# Patient Record
Sex: FEMALE | Race: WHITE
[De-identification: names, ages, dates, MRNs, and addresses within clinical notes are randomized per-mention and may not be internally consistent; named-entity substitution may affect disease eponyms.]

---

## 2019-04-04 ENCOUNTER — HOSPITAL ENCOUNTER (OUTPATIENT)
Dept: HOSPITAL 93 - RAD | Age: 74
Discharge: HOME | End: 2019-04-04
Payer: COMMERCIAL

## 2019-04-04 DIAGNOSIS — M25.572: ICD-10-CM

## 2019-04-04 DIAGNOSIS — Z12.31: Primary | ICD-10-CM

## 2019-04-04 DIAGNOSIS — Z87.898: ICD-10-CM

## 2019-04-04 DIAGNOSIS — N60.12: ICD-10-CM

## 2019-04-04 DIAGNOSIS — M79.672: ICD-10-CM

## 2019-04-04 DIAGNOSIS — E04.8: ICD-10-CM

## 2019-04-04 DIAGNOSIS — N60.11: ICD-10-CM

## 2019-04-10 ENCOUNTER — HOSPITAL ENCOUNTER (OUTPATIENT)
Dept: HOSPITAL 93 - LAB | Age: 74
Discharge: HOME | End: 2019-04-10
Attending: GENERAL PRACTICE
Payer: COMMERCIAL

## 2019-04-10 ENCOUNTER — HOSPITAL ENCOUNTER (OUTPATIENT)
Dept: HOSPITAL 93 - SONOGRAMA | Age: 74
Discharge: HOME | End: 2019-04-10
Attending: GENERAL PRACTICE
Payer: COMMERCIAL

## 2019-04-10 DIAGNOSIS — N39.0: ICD-10-CM

## 2019-04-10 DIAGNOSIS — R80.8: ICD-10-CM

## 2019-04-10 DIAGNOSIS — D64.89: ICD-10-CM

## 2019-04-10 DIAGNOSIS — Z12.11: ICD-10-CM

## 2019-04-10 DIAGNOSIS — R10.9: Primary | ICD-10-CM

## 2019-04-10 DIAGNOSIS — E11.9: ICD-10-CM

## 2019-04-10 DIAGNOSIS — M60.88: ICD-10-CM

## 2019-04-10 DIAGNOSIS — R10.13: Primary | ICD-10-CM

## 2019-04-10 DIAGNOSIS — R07.89: ICD-10-CM

## 2019-04-17 ENCOUNTER — HOSPITAL ENCOUNTER (OUTPATIENT)
Dept: HOSPITAL 93 - LAB | Age: 74
Discharge: HOME | End: 2019-04-17
Attending: GENERAL PRACTICE
Payer: COMMERCIAL

## 2019-04-17 ENCOUNTER — HOSPITAL ENCOUNTER (OUTPATIENT)
Dept: HOSPITAL 93 - NUCLEAR | Age: 74
Discharge: HOME | End: 2019-04-17
Attending: GENERAL PRACTICE
Payer: COMMERCIAL

## 2019-04-17 DIAGNOSIS — N39.0: ICD-10-CM

## 2019-04-17 DIAGNOSIS — E03.8: ICD-10-CM

## 2019-04-17 DIAGNOSIS — M81.0: Primary | ICD-10-CM

## 2019-04-17 DIAGNOSIS — R80.8: ICD-10-CM

## 2019-04-17 DIAGNOSIS — E78.49: Primary | ICD-10-CM

## 2019-04-17 DIAGNOSIS — E11.9: ICD-10-CM

## 2019-04-17 DIAGNOSIS — M60.88: ICD-10-CM

## 2019-04-17 DIAGNOSIS — E55.9: ICD-10-CM

## 2019-04-17 DIAGNOSIS — D64.89: ICD-10-CM

## 2019-04-17 DIAGNOSIS — R10.9: ICD-10-CM

## 2019-04-17 DIAGNOSIS — Z12.11: ICD-10-CM

## 2019-04-17 DIAGNOSIS — R07.89: ICD-10-CM

## 2020-03-13 ENCOUNTER — HOSPITAL ENCOUNTER (OUTPATIENT)
Dept: HOSPITAL 93 - RAD | Age: 75
Discharge: HOME | End: 2020-03-13
Attending: INTERNAL MEDICINE
Payer: COMMERCIAL

## 2020-03-13 DIAGNOSIS — M12.89: Primary | ICD-10-CM

## 2020-03-13 DIAGNOSIS — M46.47: ICD-10-CM

## 2020-06-17 ENCOUNTER — HOSPITAL ENCOUNTER (OUTPATIENT)
Dept: HOSPITAL 93 - RAD | Age: 75
Discharge: HOME | End: 2020-06-17
Attending: INTERNAL MEDICINE
Payer: COMMERCIAL

## 2020-06-17 DIAGNOSIS — M12.851: Primary | ICD-10-CM

## 2020-08-27 ENCOUNTER — HOSPITAL ENCOUNTER (OUTPATIENT)
Dept: HOSPITAL 93 - LAB | Age: 75
Discharge: HOME | End: 2020-08-27
Attending: INTERNAL MEDICINE
Payer: COMMERCIAL

## 2020-08-27 DIAGNOSIS — E78.2: ICD-10-CM

## 2020-08-27 DIAGNOSIS — E03.8: ICD-10-CM

## 2020-08-27 DIAGNOSIS — I10: ICD-10-CM

## 2020-08-27 DIAGNOSIS — E11.9: Primary | ICD-10-CM

## 2020-09-14 ENCOUNTER — HOSPITAL ENCOUNTER (OUTPATIENT)
Dept: HOSPITAL 93 - RAD | Age: 75
Discharge: HOME | End: 2020-09-14
Attending: INTERNAL MEDICINE
Payer: COMMERCIAL

## 2020-09-14 DIAGNOSIS — M12.862: Primary | ICD-10-CM

## 2020-09-18 ENCOUNTER — HOSPITAL ENCOUNTER (OUTPATIENT)
Dept: HOSPITAL 93 - CIR.AMB | Age: 75
Discharge: HOME | End: 2020-09-18
Attending: ORTHOPAEDIC SURGERY
Payer: COMMERCIAL

## 2020-09-18 DIAGNOSIS — S72.462A: Primary | ICD-10-CM

## 2020-09-18 DIAGNOSIS — Z20.828: ICD-10-CM

## 2020-09-18 PROCEDURE — 27509 TREATMENT OF THIGH FRACTURE: CPT

## 2020-10-07 ENCOUNTER — HOSPITAL ENCOUNTER (OUTPATIENT)
Dept: HOSPITAL 93 - RAD | Age: 75
Discharge: HOME | End: 2020-10-07
Attending: ORTHOPAEDIC SURGERY
Payer: COMMERCIAL

## 2020-10-07 DIAGNOSIS — S72.465D: Primary | ICD-10-CM

## 2020-10-17 ENCOUNTER — HOSPITAL ENCOUNTER (INPATIENT)
Dept: HOSPITAL 93 - ER | Age: 75
LOS: 6 days | Discharge: NURSING, REHAB OR LONG-TERM CARE | DRG: 481 | End: 2020-10-23
Attending: ORTHOPAEDIC SURGERY | Admitting: ORTHOPAEDIC SURGERY
Payer: COMMERCIAL

## 2020-10-17 VITALS — WEIGHT: 120 LBS | HEIGHT: 62 IN | BODY MASS INDEX: 22.08 KG/M2

## 2020-10-17 DIAGNOSIS — E03.9: ICD-10-CM

## 2020-10-17 DIAGNOSIS — Z91.81: ICD-10-CM

## 2020-10-17 DIAGNOSIS — Z20.828: ICD-10-CM
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
## 2020-10-17 DIAGNOSIS — M81.8: ICD-10-CM

## 2020-10-17 DIAGNOSIS — D62: ICD-10-CM

## 2020-10-17 DIAGNOSIS — S72.492D: ICD-10-CM

## 2020-10-17 DIAGNOSIS — S72.322A: Primary | ICD-10-CM

## 2020-10-17 DIAGNOSIS — Z53.8: ICD-10-CM

## 2020-10-17 NOTE — NUR
SE RECIBE PTE EN AMBULANCIA EN COMPANIA DE PERSONAL PARAMEDICO Y FAMILIAR DE
PTE QUIEN REFIERE QUE PTE TIENE HX DE OPERACION DE FEMUR L+ Y SE VOLVIO A
DISLOCAR. AL MOMENTO PTE REFIERE MUCHO DOLOR. PRESENTA BP MANUAL 160/102.

## 2020-10-17 NOTE — NUR
SE INSERTO SONDA URINARIA BAJO MEDIDAS ESTERILES. PACIENTE ELIMINO 150ML DE
ORINA COLOR AMARILLO DANTE.

## 2020-10-17 NOTE — NUR
SE RECIBE PTE ALERTA Y ORIENTADA X 3 ESFERAS EN CAMA CON BARANDAS ELEVADAS POR
SEGURIDAD. BUEN PATRON RESPIRATORIO. H/L DIANA DE EDEMA Y ERITEMA. SONDA
URINARIA DRENANDO A GRAVEDAD. PENDIENTE CONSULTA CON . SE MANTIENE EN
OBSERVACION POR CAMBIOS EN CONDICION MEDICA.

## 2020-10-17 NOTE — NUR
PACIENTE ALERTA Y ORIENTADA X3. MISS. FERNANDEZ ORIENTO A PACIENTE SOBRE TX Y
PROCEDIMEINTO A REALIZAR Y REFIRIO ENTENDER.  REALIZA MUESTRAS DE LABORATORIO
BAJO MEDIDAS ASEPTICAS. CANALIZACION PATENTE Y DIANA DE EDEMA Y ERITEMA.
PACIENTE REFIERE NO DESEA EL MILLIGAN AL MOMENTO. SE ORIENTO SOBRE LA IMPORTANCIA
DEL MISMO. ADMINISTRO MEDICAMENTO ORDENADO POR MD.

## 2020-10-18 NOTE — NUR
SE RECIBE PACIENTE ALERTA Y ORIENTADA EN CAMA CON VENOPUNCION PATENTE DIANA DE
EDEMA Y ERRITEMA. PACIENTE CON JULIANN URINARIA Y TRACCION EN NIA SANDOVAL DE
5 LIBRAS. PACIENTE EN ESPERA DE CONSULTA CON EL DR. LIU.

## 2020-10-19 PROCEDURE — B24BZZZ ULTRASONOGRAPHY OF HEART WITH AORTA: ICD-10-PCS | Performed by: INTERNAL MEDICINE

## 2020-10-20 PROCEDURE — 0QP704Z REMOVAL OF INTERNAL FIXATION DEVICE FROM LEFT UPPER FEMUR, OPEN APPROACH: ICD-10-PCS | Performed by: ORTHOPAEDIC SURGERY

## 2020-10-20 PROCEDURE — 0QS906Z REPOSITION LEFT FEMORAL SHAFT WITH INTRAMEDULLARY INTERNAL FIXATION DEVICE, OPEN APPROACH: ICD-10-PCS | Performed by: ORTHOPAEDIC SURGERY

## 2022-03-19 ENCOUNTER — HOSPITAL ENCOUNTER (EMERGENCY)
Dept: HOSPITAL 93 - ER | Age: 77
End: 2022-03-19
Payer: COMMERCIAL

## 2022-03-19 VITALS — BODY MASS INDEX: 21.57 KG/M2 | HEIGHT: 57 IN | WEIGHT: 100 LBS
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
## 2022-03-19 DIAGNOSIS — S00.83XA: ICD-10-CM

## 2022-03-19 DIAGNOSIS — S01.81XA: Primary | ICD-10-CM

## 2022-03-19 DIAGNOSIS — I10: ICD-10-CM
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
## 2023-06-09 ENCOUNTER — HOSPITAL ENCOUNTER (OUTPATIENT)
Dept: HOSPITAL 93 - LAB | Age: 78
Discharge: HOME | End: 2023-06-09
Attending: INTERNAL MEDICINE
Payer: COMMERCIAL

## 2023-06-09 DIAGNOSIS — Z12.11: ICD-10-CM

## 2023-06-09 DIAGNOSIS — E55.9: ICD-10-CM

## 2023-06-09 DIAGNOSIS — E11.9: ICD-10-CM

## 2023-06-09 DIAGNOSIS — I10: Primary | ICD-10-CM

## 2023-06-09 DIAGNOSIS — E03.9: ICD-10-CM

## 2023-06-09 DIAGNOSIS — E78.2: ICD-10-CM

## 2023-06-09 DIAGNOSIS — K92.0: ICD-10-CM

## 2023-06-21 ENCOUNTER — HOSPITAL ENCOUNTER (OUTPATIENT)
Dept: HOSPITAL 93 - LAB | Age: 78
Discharge: HOME | End: 2023-06-21
Attending: INTERNAL MEDICINE
Payer: COMMERCIAL

## 2023-06-21 ENCOUNTER — HOSPITAL ENCOUNTER (OUTPATIENT)
Dept: HOSPITAL 93 - MAMO-SONO | Age: 78
Discharge: HOME | End: 2023-06-21
Attending: INTERNAL MEDICINE
Payer: COMMERCIAL

## 2023-06-21 DIAGNOSIS — E55.9: ICD-10-CM

## 2023-06-21 DIAGNOSIS — Z12.11: ICD-10-CM

## 2023-06-21 DIAGNOSIS — E11.9: ICD-10-CM

## 2023-06-21 DIAGNOSIS — E78.2: ICD-10-CM

## 2023-06-21 DIAGNOSIS — N63.12: ICD-10-CM

## 2023-06-21 DIAGNOSIS — I10: Primary | ICD-10-CM

## 2023-06-21 DIAGNOSIS — E03.9: ICD-10-CM

## 2023-06-21 DIAGNOSIS — Z12.31: Primary | ICD-10-CM

## 2023-06-21 DIAGNOSIS — M81.0: Primary | ICD-10-CM
